# Patient Record
Sex: MALE | Race: WHITE | HISPANIC OR LATINO | URBAN - METROPOLITAN AREA
[De-identification: names, ages, dates, MRNs, and addresses within clinical notes are randomized per-mention and may not be internally consistent; named-entity substitution may affect disease eponyms.]

---

## 2017-08-05 ENCOUNTER — EMERGENCY (EMERGENCY)
Facility: HOSPITAL | Age: 47
LOS: 1 days | Discharge: HOME | End: 2017-08-05
Attending: EMERGENCY MEDICINE | Admitting: EMERGENCY MEDICINE
Payer: COMMERCIAL

## 2017-08-05 VITALS
TEMPERATURE: 99 F | HEART RATE: 72 BPM | SYSTOLIC BLOOD PRESSURE: 119 MMHG | HEIGHT: 66 IN | RESPIRATION RATE: 18 BRPM | OXYGEN SATURATION: 98 % | WEIGHT: 149.03 LBS | DIASTOLIC BLOOD PRESSURE: 81 MMHG

## 2017-08-05 DIAGNOSIS — Z90.49 ACQUIRED ABSENCE OF OTHER SPECIFIED PARTS OF DIGESTIVE TRACT: Chronic | ICD-10-CM

## 2017-08-05 LAB
APPEARANCE UR: CLEAR — SIGNIFICANT CHANGE UP
BILIRUB UR-MCNC: NEGATIVE — SIGNIFICANT CHANGE UP
COLOR SPEC: YELLOW — SIGNIFICANT CHANGE UP
DIFF PNL FLD: NEGATIVE — SIGNIFICANT CHANGE UP
EPI CELLS # UR: SIGNIFICANT CHANGE UP
GLUCOSE UR QL: NEGATIVE MG/DL — SIGNIFICANT CHANGE UP
KETONES UR-MCNC: NEGATIVE — SIGNIFICANT CHANGE UP
LEUKOCYTE ESTERASE UR-ACNC: NEGATIVE — SIGNIFICANT CHANGE UP
NITRITE UR-MCNC: NEGATIVE — SIGNIFICANT CHANGE UP
PH UR: 7 — SIGNIFICANT CHANGE UP (ref 5–8)
PROT UR-MCNC: NEGATIVE MG/DL — SIGNIFICANT CHANGE UP
RBC CASTS # UR COMP ASSIST: SIGNIFICANT CHANGE UP /HPF (ref 0–4)
SP GR SPEC: 1.01 — SIGNIFICANT CHANGE UP (ref 1.01–1.02)
UROBILINOGEN FLD QL: NEGATIVE MG/DL — SIGNIFICANT CHANGE UP
WBC UR QL: SIGNIFICANT CHANGE UP

## 2017-08-05 PROCEDURE — 71046 X-RAY EXAM CHEST 2 VIEWS: CPT

## 2017-08-05 PROCEDURE — 99284 EMERGENCY DEPT VISIT MOD MDM: CPT

## 2017-08-05 PROCEDURE — 99283 EMERGENCY DEPT VISIT LOW MDM: CPT | Mod: 25

## 2017-08-05 PROCEDURE — 71020: CPT | Mod: 26

## 2017-08-05 PROCEDURE — 87081 CULTURE SCREEN ONLY: CPT

## 2017-08-05 PROCEDURE — 81001 URINALYSIS AUTO W/SCOPE: CPT

## 2017-08-05 RX ORDER — IBUPROFEN 200 MG
600 TABLET ORAL ONCE
Qty: 0 | Refills: 0 | Status: COMPLETED | OUTPATIENT
Start: 2017-08-05 | End: 2017-08-05

## 2017-08-05 RX ADMIN — Medication 600 MILLIGRAM(S): at 21:46

## 2017-08-05 NOTE — ED STATDOCS - ATTENDING CONTRIBUTION TO CARE
I, Mariana Westbrook, performed the initial face to face bedside interview with this patient regarding history of present illness, review of symptoms and relevant past medical, social and family history.  I completed an independent physical examination.  I was the initial provider who evaluated this patient. I have signed out the follow up of any pending tests (i.e. labs, radiological studies) to the ACP.  I have communicated the patient’s plan of care and disposition with the ACP.

## 2017-08-05 NOTE — ED STATDOCS - PROGRESS NOTE DETAILS
PA NOTE: Pt seen by intake physician and HPI/ROS/PE/MDM reviewed. Pt seen and evaluated by intake doc. Discussed plan and any resulted studies at this time. Will continue to monitor and re-evaluate. On re-eval: Non-toxic. Well appearing. Cardiac: RRR Lungs: CTA b/l Throat: mild pharyngeal erythema w/ no exudates, uvula midline. TMs: normal TMs bilat. Plan: pending urine. CXR normal. Will send throat culture. CXR WNL. Urine WNL. Will send throat culture. Tolerating po. No vomiting. Plan: likely viral infection. Motrin. Tylenol. Increase fluids.

## 2017-08-05 NOTE — ED STATDOCS - OBJECTIVE STATEMENT
46 y/o M w/ PMHx of appendectomy presents to the ED c/o constant 100F fever and worsening myalgia x3 days. He also has a dry cough, a sore throat, dysuria . Pt denies rhinorrhea, abd pain, n/v, chills, SOB, CP, HA or any other complaints at this time. 46 y/o M w/ PMHx of appendectomy presents to the ED c/o  100F fever and worsening myalgia x3 days. He also has a dry cough, a sore throat, dysuria . notes no fever today Pt denies rhinorrhea, abd pain, n/v, chills, SOB, CP, HA or any other complaints at this time. no sick contacts no recent travel

## 2017-08-08 LAB
CULTURE RESULTS: SIGNIFICANT CHANGE UP
SPECIMEN SOURCE: SIGNIFICANT CHANGE UP

## 2018-11-26 PROBLEM — Z00.00 ENCOUNTER FOR PREVENTIVE HEALTH EXAMINATION: Status: ACTIVE | Noted: 2018-11-26

## 2018-12-11 ENCOUNTER — NON-APPOINTMENT (OUTPATIENT)
Age: 48
End: 2018-12-11

## 2018-12-11 ENCOUNTER — APPOINTMENT (OUTPATIENT)
Dept: CARDIOLOGY | Facility: CLINIC | Age: 48
End: 2018-12-11
Payer: MEDICAID

## 2018-12-11 VITALS
HEART RATE: 67 BPM | OXYGEN SATURATION: 98 % | WEIGHT: 150 LBS | DIASTOLIC BLOOD PRESSURE: 78 MMHG | SYSTOLIC BLOOD PRESSURE: 126 MMHG | HEIGHT: 64 IN | BODY MASS INDEX: 25.61 KG/M2

## 2018-12-11 DIAGNOSIS — Z86.39 PERSONAL HISTORY OF OTHER ENDOCRINE, NUTRITIONAL AND METABOLIC DISEASE: ICD-10-CM

## 2018-12-11 DIAGNOSIS — R94.31 ABNORMAL ELECTROCARDIOGRAM [ECG] [EKG]: ICD-10-CM

## 2018-12-11 PROCEDURE — 93000 ELECTROCARDIOGRAM COMPLETE: CPT

## 2018-12-11 PROCEDURE — 99204 OFFICE O/P NEW MOD 45 MIN: CPT

## 2018-12-20 ENCOUNTER — APPOINTMENT (OUTPATIENT)
Dept: CARDIOLOGY | Facility: CLINIC | Age: 48
End: 2018-12-20
Payer: MEDICAID

## 2018-12-20 DIAGNOSIS — Z83.3 FAMILY HISTORY OF DIABETES MELLITUS: ICD-10-CM

## 2018-12-20 DIAGNOSIS — I71.2 THORACIC AORTIC ANEURYSM, W/OUT RUPTURE: ICD-10-CM

## 2018-12-20 PROCEDURE — 93015 CV STRESS TEST SUPVJ I&R: CPT

## 2018-12-20 PROCEDURE — 93306 TTE W/DOPPLER COMPLETE: CPT

## 2018-12-20 PROCEDURE — 78452 HT MUSCLE IMAGE SPECT MULT: CPT

## 2018-12-20 PROCEDURE — A9500: CPT

## 2019-01-11 NOTE — HISTORY OF PRESENT ILLNESS
[FreeTextEntry1] : 47 y/o with diet controlled diabetes mellitus (since 2015), hyperlipidemia on atorvastatin presents with abnormal ECG.  RBBB noted by PCP.  Occasionally  atypical left sided chest pain, intermittent, fleeting.  3 weeks ago, had it for longer, 40 minutes, sharp "like a knife", worse with taking in a deep breath.  Relieved on its own.  8/10 in severity.  No associated diaphoresis, dizziness, sob. Was doing landscaping at the time.  Former smoker - quit age 38.

## 2019-01-11 NOTE — REASON FOR VISIT
[Initial Evaluation] : an initial evaluation of [Abnormal ECG] : an abnormal ECG [FreeTextEntry1] : new RBBB on ECG

## 2019-01-11 NOTE — ASSESSMENT
[FreeTextEntry1] : # atypical chest pain - intermediate risk for obstructive CAD.  Sending for exercise +/- regadenoson nuclear stress test. TTE\par # hyperlipidemia - on statin therapy.  Yearly lipid panel with PCP. \par # abnormal ECG - as above, for echo and stress testing\par \par Thank you for allowing me to participate in your patient's care.  Please contact me if there are any questions or concerns.\par

## 2019-01-11 NOTE — PHYSICAL EXAM
[General Appearance - Well Developed] : well developed [Normal Appearance] : normal appearance [Well Groomed] : well groomed [General Appearance - Well Nourished] : well nourished [No Deformities] : no deformities [General Appearance - In No Acute Distress] : no acute distress [Normal Conjunctiva] : the conjunctiva exhibited no abnormalities [Eyelids - No Xanthelasma] : the eyelids demonstrated no xanthelasmas [Normal Oral Mucosa] : normal oral mucosa [No Oral Pallor] : no oral pallor [No Oral Cyanosis] : no oral cyanosis [Normal Jugular Venous V Waves Present] : normal jugular venous V waves present [Heart Rate And Rhythm] : heart rate and rhythm were normal [Heart Sounds] : normal S1 and S2 [Murmurs] : no murmurs present [Respiration, Rhythm And Depth] : normal respiratory rhythm and effort [Exaggerated Use Of Accessory Muscles For Inspiration] : no accessory muscle use [Auscultation Breath Sounds / Voice Sounds] : lungs were clear to auscultation bilaterally [Abdomen Soft] : soft [Abdomen Tenderness] : non-tender [Abnormal Walk] : normal gait [Gait - Sufficient For Exercise Testing] : the gait was sufficient for exercise testing [Nail Clubbing] : no clubbing of the fingernails [Cyanosis, Localized] : no localized cyanosis [Petechial Hemorrhages (___cm)] : no petechial hemorrhages [Skin Color & Pigmentation] : normal skin color and pigmentation [] : no rash [No Venous Stasis] : no venous stasis [Skin Lesions] : no skin lesions [No Skin Ulcers] : no skin ulcer [No Xanthoma] : no  xanthoma was observed [Oriented To Time, Place, And Person] : oriented to person, place, and time [Affect] : the affect was normal [Mood] : the mood was normal [No Anxiety] : not feeling anxious [Bowel Sounds] : normal bowel sounds [FreeTextEntry1] : no JVD, no carotid bruits

## 2019-01-25 PROBLEM — I71.2 THORACIC AORTIC ANEURYSM WITHOUT RUPTURE: Status: ACTIVE | Noted: 2019-01-25

## 2020-11-05 RX ORDER — ATORVASTATIN CALCIUM 40 MG/1
40 TABLET, FILM COATED ORAL DAILY
Qty: 90 | Refills: 1 | Status: ACTIVE | COMMUNITY
Start: 2018-12-11

## 2020-11-06 ENCOUNTER — APPOINTMENT (OUTPATIENT)
Dept: CARDIOLOGY | Facility: CLINIC | Age: 50
End: 2020-11-06
Payer: COMMERCIAL

## 2020-11-06 ENCOUNTER — NON-APPOINTMENT (OUTPATIENT)
Age: 50
End: 2020-11-06

## 2020-11-06 VITALS
HEART RATE: 64 BPM | DIASTOLIC BLOOD PRESSURE: 79 MMHG | WEIGHT: 156 LBS | TEMPERATURE: 97.9 F | SYSTOLIC BLOOD PRESSURE: 126 MMHG | OXYGEN SATURATION: 98 % | BODY MASS INDEX: 26.78 KG/M2

## 2020-11-06 DIAGNOSIS — Q25.44 CONGENITAL DILATION OF AORTA: ICD-10-CM

## 2020-11-06 DIAGNOSIS — R07.89 OTHER CHEST PAIN: ICD-10-CM

## 2020-11-06 PROCEDURE — 93000 ELECTROCARDIOGRAM COMPLETE: CPT

## 2020-11-06 PROCEDURE — 99214 OFFICE O/P EST MOD 30 MIN: CPT

## 2020-11-06 PROCEDURE — 99072 ADDL SUPL MATRL&STAF TM PHE: CPT

## 2020-11-06 RX ORDER — METFORMIN HYDROCHLORIDE 500 MG/1
500 TABLET, FILM COATED, EXTENDED RELEASE ORAL DAILY
Refills: 0 | Status: ACTIVE | COMMUNITY
Start: 2020-11-06

## 2020-11-06 NOTE — HISTORY OF PRESENT ILLNESS
[FreeTextEntry1] : Patient is a 48-year-old male with a diabetes mellitus since 2015 on Metformin, hyperlipidemia on statin therapy, history of right bundle branch block on EKG. patient is followed in [facility].  Patient referred by his primary care physician for chest pain.  Patient is a poor informant.  Patient has a long history of substernal chest pressure, frequency of episodes  every week, duration of each episode less than a minute, denies any associated nausea vomiting diaphoresis.  Patient denies orthopnea, PND or leg edema.\par \par Patient is a non-smoker.  Patient has no prior history of CAD or CHF.  Patient has no TIA or CVA.

## 2020-11-06 NOTE — PHYSICAL EXAM
[General Appearance - Well Developed] : well developed [General Appearance - Well Nourished] : well nourished [Normal Conjunctiva] : the conjunctiva exhibited no abnormalities [FreeTextEntry1] : No JVD [Respiration, Rhythm And Depth] : normal respiratory rhythm and effort [Auscultation Breath Sounds / Voice Sounds] : lungs were clear to auscultation bilaterally [Heart Rate And Rhythm] : heart rate and rhythm were normal [Heart Sounds] : normal S1 and S2 [Murmurs] : no murmurs present [Bowel Sounds] : normal bowel sounds [Abdomen Soft] : soft [Abnormal Walk] : normal gait [Cyanosis, Localized] : no localized cyanosis [] : no rash

## 2020-11-06 NOTE — ASSESSMENT
[FreeTextEntry1] : EK2020: Normal sinus rhythm, right bundle branch block.\par \par Assessment:\par 1.  Chest pain\par 2.  NIDDM\par 3.  Hyperlipidemia\par \par Recommendations:\par 1.  Patient is advised to have an echocardiogram and regular stress test.\par 2.  Follow-up after the testing

## 2020-12-15 ENCOUNTER — APPOINTMENT (OUTPATIENT)
Dept: CARDIOLOGY | Facility: CLINIC | Age: 50
End: 2020-12-15
Payer: COMMERCIAL

## 2020-12-15 PROCEDURE — 93015 CV STRESS TEST SUPVJ I&R: CPT

## 2020-12-15 PROCEDURE — 99072 ADDL SUPL MATRL&STAF TM PHE: CPT

## 2020-12-15 PROCEDURE — 93306 TTE W/DOPPLER COMPLETE: CPT

## 2021-02-08 ENCOUNTER — NON-APPOINTMENT (OUTPATIENT)
Age: 51
End: 2021-02-08

## 2021-02-08 ENCOUNTER — APPOINTMENT (OUTPATIENT)
Dept: CARDIOLOGY | Facility: CLINIC | Age: 51
End: 2021-02-08
Payer: COMMERCIAL

## 2021-02-08 VITALS
DIASTOLIC BLOOD PRESSURE: 72 MMHG | SYSTOLIC BLOOD PRESSURE: 113 MMHG | HEART RATE: 67 BPM | OXYGEN SATURATION: 95 % | HEIGHT: 64 IN | TEMPERATURE: 98.6 F | BODY MASS INDEX: 26.29 KG/M2 | WEIGHT: 154 LBS

## 2021-02-08 DIAGNOSIS — Q21.1 ATRIAL SEPTAL DEFECT: ICD-10-CM

## 2021-02-08 PROCEDURE — 99072 ADDL SUPL MATRL&STAF TM PHE: CPT

## 2021-02-08 PROCEDURE — 99213 OFFICE O/P EST LOW 20 MIN: CPT

## 2021-02-08 PROCEDURE — 93000 ELECTROCARDIOGRAM COMPLETE: CPT

## 2021-02-08 NOTE — HISTORY OF PRESENT ILLNESS
[FreeTextEntry1] : Patient is a 48-year-old male with a diabetes mellitus since 2015 on Metformin, hyperlipidemia on statin therapy, history of right bundle branch block on EKG. patient is followed in [facility].  Patient was seen by me November 2020 for chest pain.  Patient is a poor informant.  \par \par Patient presents for a follow-up visit.  Since last visit patient had an echocardiogram and a stress test.  Patient is feeling fine reports no chest pain or dyspnea.\par \par Patient is a non-smoker.  Patient has no prior history of CAD or CHF.  Patient has no TIA or CVA.

## 2021-02-08 NOTE — PHYSICAL EXAM
[General Appearance - Well Developed] : well developed [General Appearance - Well Nourished] : well nourished [Normal Conjunctiva] : the conjunctiva exhibited no abnormalities [Respiration, Rhythm And Depth] : normal respiratory rhythm and effort [Auscultation Breath Sounds / Voice Sounds] : lungs were clear to auscultation bilaterally [Heart Rate And Rhythm] : heart rate and rhythm were normal [Heart Sounds] : normal S1 and S2 [Murmurs] : no murmurs present [Bowel Sounds] : normal bowel sounds [Abdomen Soft] : soft [Abnormal Walk] : normal gait [Cyanosis, Localized] : no localized cyanosis [] : no rash [FreeTextEntry1] : No JVD

## 2021-02-08 NOTE — ASSESSMENT
[FreeTextEntry1] : EK2020: Normal sinus rhythm, right bundle branch block.\par EKG 2021- Sinus  Bradycardia \par -Right bundle branch block. \par Echocardiogram: December, 15, 2020: LVEF 60 to 65%.  Mild mitral valve regurgitation.  Small PFO with left to right shunting.  Normal RV size and function.  Normal RA size\par \par Regular stress test: December 15, 2020: Negative stress test\par \par \par Assessment:\par 1.  Chest pain- Resolved\par 2.  NIDDM\par 3.  Hyperlipidemia\par 4. PFO- No history of TIA. \par \par Recommendations:\par Test results were reviewed with the patient.  Patient was informed about small PFO.\par 1.  We will continue to monitor PFO and any future episodes of TIA or CVA.\par 2.  Follow-up 1 year.

## 2021-07-21 ENCOUNTER — EMERGENCY (EMERGENCY)
Facility: HOSPITAL | Age: 51
LOS: 1 days | Discharge: HOME | End: 2021-07-21
Attending: EMERGENCY MEDICINE
Payer: COMMERCIAL

## 2021-07-21 VITALS
DIASTOLIC BLOOD PRESSURE: 80 MMHG | WEIGHT: 179.9 LBS | SYSTOLIC BLOOD PRESSURE: 129 MMHG | HEIGHT: 64 IN | OXYGEN SATURATION: 98 % | TEMPERATURE: 98 F | RESPIRATION RATE: 16 BRPM | HEART RATE: 70 BPM

## 2021-07-21 DIAGNOSIS — Z90.49 ACQUIRED ABSENCE OF OTHER SPECIFIED PARTS OF DIGESTIVE TRACT: Chronic | ICD-10-CM

## 2021-07-21 PROCEDURE — 99283 EMERGENCY DEPT VISIT LOW MDM: CPT

## 2021-07-21 NOTE — ED ADULT TRIAGE NOTE - IDEAL BODY WEIGHT(KG)
Patient Education        Cholesterol and Triglycerides Tests: About These Tests  What are they? Cholesterol and triglycerides tests measure the amount of fats in your blood. These fats have both \"good\" (HDL) and \"bad\" (LDL) cholesterol. Why are these tests done? These tests are done to help find out your risk of a heart attack and stroke. They can help your doctor find out how well medicine is working for some health problems. How can you prepare for these tests? · Your doctor may tell you to fast before your tests. This means that you do not eat or drink anything except water for 9 to 12 hours before the tests. In most cases, you can take your medicines with water the morning of the test.  · Do not eat high-fat foods the night before the tests. · Do not drink alcohol or do intense exercise the night before the tests. · Be sure to tell your doctor about all the over-the-counter and prescription medicines and herbs or other supplements you take. They can affect the results of these tests. What happens during these tests? A health professional takes a sample of your blood. What else should you know about these tests? Your cholesterol levels can help your doctor find out your risk for having a heart attack or stroke. But it's not just about your cholesterol. Your doctor uses your cholesterol levels plus other things to calculate your risk. These include:  · Your blood pressure. · Whether or not you have diabetes. · Your age, sex, and race. · Whether or not you smoke. You and your doctor can talk about whether you need to lower your risk and what treatment is best for you. Where can you learn more? Go to [email or web address] and sign in to your [employer] account. Enter U349 in the [employer] box to learn more about \"Cholesterol and Triglycerides Tests: About These Tests. \"     If you do not have an account, please click on the \"Sign Up Now\" link.   Current as of: July 22, 2018  Content Version: 12.0  © 9244-3873 Healthwise, Incorporated. Care instructions adapted under license by [facility] ([facility]). If you have questions about a medical condition or this instruction, always ask your healthcare professional. [address] any warranty or liability for your use of this information. 59

## 2021-07-21 NOTE — ED ADULT TRIAGE NOTE - CHIEF COMPLAINT QUOTE
pt c/o right eye pain since yesterday associated with discharge pain and redness.  pt feels llike eye is burning

## 2021-07-22 RX ORDER — POLYMYXIN B SULF/TRIMETHOPRIM 10000-1/ML
1 DROPS OPHTHALMIC (EYE) ONCE
Refills: 0 | Status: COMPLETED | OUTPATIENT
Start: 2021-07-22 | End: 2021-07-22

## 2021-07-22 RX ADMIN — Medication 1 DROP(S): at 01:46

## 2021-07-22 NOTE — ED PROVIDER NOTE - PATIENT PORTAL LINK FT
You can access the FollowMyHealth Patient Portal offered by [facility] by registering at the following website: [email or web address]. By joining [employer]’s FollowMyHealth portal, you will also be able to view your health information using other applications (apps) compatible with our system.

## 2021-07-22 NOTE — ED PROVIDER NOTE - PROGRESS NOTE DETAILS
attempted to gently remove FB with q-tip, however unable to, will have pt follow up with optho. He was explained the risks of not following up - UDAY James

## 2021-07-22 NOTE — ED PROVIDER NOTE - CLINICAL SUMMARY MEDICAL DECISION MAKING FREE TEXT BOX
53 year old male with pmhx of htn presents c/o eye pain. 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 Denies vision change. Fluorescein/woods lamp exam notable for corneal foreign body, was able to remove small portion with q-tip, unable to remove remainder. Started on polytrim, patient to call optho in the morning for an appointment within 1-2 days. Return precautions given.

## 2021-07-22 NOTE — ED PROVIDER NOTE - ATTENDING CONTRIBUTION TO CARE
Clemente: I performed a face to face bedside interview with patient regarding history of present illness, review of symptoms and past medical history. I completed an independent physical exam.  I have discussed patient's plan of care with advanced care provider.   I agree with note as stated above including HISTORY OF PRESENT ILLNESS, HIV, PAST MEDICAL/SURGICAL/FAMILY/SOCIAL HISTORY, ALLERGIES AND HOME MEDICATIONS, REVIEW OF SYSTEMS, PHYSICAL EXAM, MEDICAL DECISION MAKING and any PROGRESS NOTES during the time I functioned as the attending physician for this patient  unless otherwise noted. My brief assessment is as follows: Clemente JAUREGUI: 53M p/w R eye pain after cutting trees, _ tearing, + light sensitivity, +FB sensation. Denies vision change. Fluorescein/woods lamp exam notable for corneal foreign body, was able to remove small portion with q-tip, unable to remove remainder. Started on polytrim, patient to call optho in the morning for an appointment within 1-2 days. Return precautions given.

## 2021-07-22 NOTE — ED PROVIDER NOTE - NSFOLLOWUPINSTRUCTIONS_ED_ALL_ED_FT
Follow up with PCP within 1-2 days   follow up with Ophthalmologist within 1-2 days   Apply eye drops 3x daily, 4 drops     return if new or worsening symptoms

## 2021-07-22 NOTE — ED PROVIDER NOTE - OBJECTIVE STATEMENT
53 year old male with pmhx of htn presents c/o eye pain. pain began yesterday 5 pm after work, was cutting trees with sunglasses on. felt something enter his eye, didn't think much of it. pain progressed, + discharge, light sensitivity. denies chest pain, shortness of breath, vision changes, nausea, vomiting, fever/chills, allergies, pain worse with eye movement.  no contact or glasses use

## 2021-07-22 NOTE — ED PROVIDER NOTE - CPE EDP NEURO NORM
normal... High Dose Vitamin A Counseling: Side effects reviewed, pt to contact office should one occur.

## 2021-07-22 NOTE — ED PROVIDER NOTE - CARE PROVIDER_API CALL
Jovan Downs)  Ophthalmology  [address] [place] [address]  Phone: [phone]  Fax: [phone]  Follow Up Time:

## 2022-02-02 ENCOUNTER — TRANSCRIPTION ENCOUNTER (OUTPATIENT)
Age: 52
End: 2022-02-02

## 2022-02-11 ENCOUNTER — APPOINTMENT (OUTPATIENT)
Dept: CARDIOLOGY | Facility: CLINIC | Age: 52
End: 2022-02-11

## 2022-06-08 ENCOUNTER — NON-APPOINTMENT (OUTPATIENT)
Age: 52
End: 2022-06-08

## 2022-10-05 NOTE — ED PROVIDER NOTE - NPI NUMBER (FOR [employer] USE ONLY) :
Patient reports concerns regarding 40+lb weight loss in a 2 month period. Patient states he has decreased appetite at times but reports he got his appetite back and he still is losing weight. Patient recently seen here for same complaint. MSDF MD would like patient to be re-evaluated. Patient c/o pain to lower back, back of legs all the way up to shoulders.    [[id number]]

## 2023-01-21 ENCOUNTER — OFFICE (OUTPATIENT)
Dept: URBAN - METROPOLITAN AREA CLINIC 112 | Facility: CLINIC | Age: 53
Setting detail: OPHTHALMOLOGY
End: 2023-01-21
Payer: COMMERCIAL

## 2023-01-21 VITALS — HEIGHT: 60 IN

## 2023-01-21 DIAGNOSIS — H16.222: ICD-10-CM

## 2023-01-21 DIAGNOSIS — H25.13: ICD-10-CM

## 2023-01-21 DIAGNOSIS — H43.393: ICD-10-CM

## 2023-01-21 DIAGNOSIS — H17.9: ICD-10-CM

## 2023-01-21 DIAGNOSIS — H16.223: ICD-10-CM

## 2023-01-21 DIAGNOSIS — H01.004: ICD-10-CM

## 2023-01-21 DIAGNOSIS — H01.001: ICD-10-CM

## 2023-01-21 DIAGNOSIS — H11.153: ICD-10-CM

## 2023-01-21 DIAGNOSIS — H16.221: ICD-10-CM

## 2023-01-21 DIAGNOSIS — E11.9: ICD-10-CM

## 2023-01-21 PROCEDURE — 92014 COMPRE OPH EXAM EST PT 1/>: CPT | Performed by: OPHTHALMOLOGY

## 2023-01-21 PROCEDURE — 83861 MICROFLUID ANALY TEARS: CPT | Performed by: OPHTHALMOLOGY

## 2023-01-21 PROCEDURE — 92250 FUNDUS PHOTOGRAPHY W/I&R: CPT | Performed by: OPHTHALMOLOGY

## 2023-01-21 ASSESSMENT — REFRACTION_AUTOREFRACTION
OD_CYLINDER: -1.75
OS_SPHERE: +0.75
OS_AXIS: 076
OD_AXIS: 161
OD_SPHERE: +3.00
OS_CYLINDER: -0.50

## 2023-01-21 ASSESSMENT — TONOMETRY
OD_IOP_MMHG: 12
OS_IOP_MMHG: 13

## 2023-01-21 ASSESSMENT — REFRACTION_MANIFEST
OD_VA1: 20/20
OS_VA1: 20/20
OS_CYLINDER: -0.75
OS_AXIS: 085
OD_SPHERE: +1.50
OD_CYLINDER: -1.00
OD_AXIS: 065
OS_SPHERE: +1.00

## 2023-01-21 ASSESSMENT — SPHEQUIV_DERIVED
OS_SPHEQUIV: 0.625
OD_SPHEQUIV: 2.125
OS_SPHEQUIV: 0.5
OD_SPHEQUIV: 1

## 2023-01-21 ASSESSMENT — LID EXAM ASSESSMENTS
OS_BLEPHARITIS: LUL 1+
OD_BLEPHARITIS: RUL 1+

## 2023-01-21 ASSESSMENT — AXIALLENGTH_DERIVED
OD_AL: 23.3631
OS_AL: 23.6239
OS_AL: 23.6729
OD_AL: 23.801

## 2023-01-21 ASSESSMENT — REFRACTION_CURRENTRX
OS_SPHERE: +2.00
OS_OVR_VA: 20/
OD_SPHERE: +2.00
OD_OVR_VA: 20/

## 2023-01-21 ASSESSMENT — KERATOMETRY
OS_K2POWER_DIOPTERS: 42.75
OS_AXISANGLE_DEGREES: 090
OS_K1POWER_DIOPTERS: 42.75
OD_AXISANGLE_DEGREES: 082
OD_K1POWER_DIOPTERS: 40.75
OD_K2POWER_DIOPTERS: 43.00

## 2023-01-21 ASSESSMENT — SUPERFICIAL PUNCTATE KERATITIS (SPK)
OD_SPK: T
OS_SPK: T

## 2023-01-21 ASSESSMENT — CORNEAL TRAUMA - FOREIGN BODY: OD_FOREIGNBODY: ABSENT

## 2023-01-21 ASSESSMENT — CONFRONTATIONAL VISUAL FIELD TEST (CVF)
OD_FINDINGS: FULL
OS_FINDINGS: FULL

## 2023-01-21 ASSESSMENT — VISUAL ACUITY
OS_BCVA: 20/30
OD_BCVA: 20/25-1

## 2023-01-21 ASSESSMENT — CORNEAL SURGICAL SCARRING: OD_SCARRING: STROMAL
